# Patient Record
(demographics unavailable — no encounter records)

---

## 2024-11-08 NOTE — HISTORY OF PRESENT ILLNESS
[FreeTextEntry1] : follow up htn feels mood is off [de-identified] : follow up htn feels a bit depressed

## 2024-11-08 NOTE — HEALTH RISK ASSESSMENT
[Yes] : Yes [No falls in past year] : Patient reported no falls in the past year [2] : 2) Feeling down, depressed, or hopeless for more than half of the days (2) [PHQ-2 Negative - No further assessment needed] : PHQ-2 Negative - No further assessment needed [Several Days (1)] : 8.) Moving or speaking so slowly that other people could have noticed, or the opposite, moving or speaking faster than usual? Several days [Not at All (0)] : 9.) Thoughts that you would be off dead or of hurting yourself in some way? Not at all [Moderate] : Severity of Depression is Moderate [Not at all] : How difficult have these problems made it for you to do your work, take care of things at home, or get along with people? Not at all [RAN0Mlveh] : 4 [[id number]] : 6 [Never] : Never

## 2024-11-13 NOTE — REVIEW OF SYSTEMS
[Joint Pain] : joint pain [Joint Stiffness] : joint stiffness [Joint Swelling] : joint swelling [Negative] : Heme/Lymph [FreeTextEntry9] : Bilateral hip and left knee

## 2024-11-13 NOTE — DISCUSSION/SUMMARY
[Medication Risks Reviewed] : Medication risks reviewed [Surgical risks reviewed] : Surgical risks reviewed [de-identified] : This is a 66 year old M pt presents today for bilateral hips and left knee pain. He is overall happy with the surgical outcome of the hips. X-ray of his left knee showed bone-on-bone medial compartmental osteoarthritis. The nature of condition and treatment options were discussed in detail. Pt is a candidate for left TKA. However, pt thinks the pain in the knee is tolerable at this time. I discussed conservative treatment such as cortisone injections, HA injections, PT, anti-inflammatories, and low impact exercise. He should continue to do low impact exercises. He may take Tylenol and NSAIDs as needed. F/u PRN.   The patient is a 66 year old individual with end stage arthritis of their left knee joint. The patient has exhausted a minimum of 3 months conservative treatment including prior injections (cortisone and/or hyaluronic acid injections), physical therapy, over the counter NSAIDS and pain medication where indicated. In addition, the patient's quality of life is diminished due to significant chronic pain. The patient is having difficulty with activities of daily living, including ambulating, descending stairs, and rising from a seated position. Based upon the patients continued symptoms and failure to respond to conservative treatment, I have recommended a left total knee replacement for this patient. A long discussion took place with the patient describing what a total joint replacement is and what the procedure would entail. A knee model, similar to the implant that will be used during the operation, was utilized to demonstrate and to discuss the various bearing surfaces of the implants. Implant fixation, use of cement, was also discussed with the patient. Choices of implant manufacturers, mainly [employer] and [employer], were discussed and reviewed with preference to be made by patient and surgeon prior to operation. Final selection to be based on customary practice as well as preoperative templating with selection confirmed intraoperatively based on the patient's anatomy. The patient participated and agreed with the decision-making process. The hospitalization and post-operative care and rehabilitation were also discussed. The use of perioperative antibiotics and DVT prophylaxis were discussed. The risk, benefits and alternatives to a surgical intervention were discussed at length with the patient. The patient was also advised of risks related to the medical comorbidities and elevated body mass index (BMI). A lengthy discussion took place to review the most common complications including but not limited to: deep vein thrombosis, pulmonary embolism, heart attack, stroke, infection, wound breakdown, numbness, damage to nerves, tendon, muscles, arteries or other blood vessels, death and other possible complications from anesthesia. The patient was told that we will take steps to minimize these risks by using sterile technique, antibiotics and DVT prophylaxis when appropriate and follow the patient postoperatively in the office setting to monitor progress. The possibility of recurrent pain, no improvement in pain and actual worsening of pain were also discussed with the patient.   The discharge plan of care focused on the patient going home following surgery. The patient was encouraged to make the necessary arrangements to have someone stay with them when they are discharged home. Following discharge, a home care nurse will visit the patient. The home care nurse will open your home care case and request home physical therapy services. Home physical therapy will commence following discharge provided it is appropriate and covered by the health insurance benefit plan.   The benefits of surgery were discussed with the patient including the potential for improving his/her current clinical condition through operative intervention. Alternatives to surgical intervention including continued conservative management were also discussed in detail. All questions were answered to the satisfaction of the patient. The treatment plan of care, as well as a model of a total knee equivalent to the one that will be used for their total joint replacement, was shared with the patient. The patient participated and agreed to the plan of care as well as the use of the recommended implants for their total joint replacement surgery.

## 2024-11-13 NOTE — END OF VISIT
[FreeTextEntry3] : I, Sandy Vega, acted solely as a scribe for Dr. Liam Vergara on this date 11/13/2024.  I, Liam Vergara MD, personally performed the services described in the documentation, reviewed the documentation recorded by the scribe in my presence and it accurately and completely records my words and actions.

## 2024-11-13 NOTE — HISTORY OF PRESENT ILLNESS
[Pain Location] : pain [Stable] : stable [2] : a current pain level of 2/10 [de-identified] : This is a 66-year-old male presented with stiffness and pain in bilateral hips and Upper buttock area. He notes the discomfort after doing some yard work. He denies groin pain. He is ambulating without walking assistive device. He has known end-stage left knee osteoarthritis. However, he is not having pain in the left knee. Pt c/o crunching in the knee when he tries to bend. He also has trouble bending down and reaching his feet to put on shoes and socks.  Pt wants to stay active, but it gets hard with pain.   [de-identified] : Yard work

## 2024-11-13 NOTE — PHYSICAL EXAM
[LE] : 5/5 motor strength in bilateral lower extremities [Antalgic] : not antalgic [de-identified] : GENERAL APPEARANCE: Well nourished and hydrated, pleasant, alert, and oriented x 3. Appears their stated age.  HEENT: Normocephalic, extraocular eye motion intact. Nasal septum midline. Oral cavity clear. External auditory canal clear.  RESPIRATORY: Breath sounds clear and audible in all lobes. No wheezing, No accessory muscle use. CARDIOVASCULAR: No apparent abnormalities. No lower leg edema. No varicosities. Pedal pulses are palpable. NEUROLOGIC: Sensation is normal, no muscle weakness in the upper or lower extremities. DERMATOLOGIC: No apparent skin lesions, moist, warm, no rash. SPINE: Cervical spine appears normal and moves freely; thoracic spine appears normal and moves freely; lumbosacral spine appears normal and moves freely, normal, nontender. MUSCULOSKELETAL: Hands, wrists, and elbows are normal and move freely, shoulders are normal and move freely.  Musculoskeletal: Gait: normal and not antalgic.  Bilateral hip exam shows healed incision nonantalgic gait.  Hip range of motion is adequate without groin pain   5/5 motor strength in bilateral lower extremities. Sensory: Intact in bilateral lower extremities. DTRs: Biceps, brachioradialis, triceps, patellar, ankle and plantar 2+ and symmetric bilaterally. Pulses: dorsalis pedis, posterior tibial, femoral, popliteal, and radial 2+ and symmetric bilaterally.   [de-identified] : 4V xray of the left knee done in the office today and reviewed by Dr. Liam Vergara demonstrates bone-on-bone medial compartmental osteoarthritis.  3V xray of the left hip done in office today and reviewed by Dr. Liam Vergara demonstrates s/p left hip implants in good positioning with no evidence of wear, loosening, or subsidence.

## 2025-01-17 NOTE — HEALTH RISK ASSESSMENT
[No] : No [No falls in past year] : Patient reported no falls in the past year [Little interest or pleasure doing things] : 1) Little interest or pleasure doing things [Feeling down, depressed, or hopeless] : 2) Feeling down, depressed, or hopeless [0] : 2) Feeling down, depressed, or hopeless: Not at all (0) [PHQ-2 Negative - No further assessment needed] : PHQ-2 Negative - No further assessment needed [[id number]] : 0 [Current] : Current

## 2025-01-17 NOTE — HISTORY OF PRESENT ILLNESS
[FreeTextEntry1] : for follow up anxiety and depression [de-identified] : follow up anxiety and depression follow up htn hld